# Patient Record
Sex: FEMALE | Race: WHITE | NOT HISPANIC OR LATINO | Employment: UNEMPLOYED | URBAN - METROPOLITAN AREA
[De-identification: names, ages, dates, MRNs, and addresses within clinical notes are randomized per-mention and may not be internally consistent; named-entity substitution may affect disease eponyms.]

---

## 2017-06-01 ENCOUNTER — OFFICE VISIT (OUTPATIENT)
Dept: FAMILY MEDICINE | Facility: CLINIC | Age: 67
End: 2017-06-01
Payer: COMMERCIAL

## 2017-06-01 VITALS
RESPIRATION RATE: 18 BRPM | HEIGHT: 65 IN | OXYGEN SATURATION: 99 % | SYSTOLIC BLOOD PRESSURE: 112 MMHG | BODY MASS INDEX: 31.49 KG/M2 | WEIGHT: 189 LBS | DIASTOLIC BLOOD PRESSURE: 72 MMHG | TEMPERATURE: 98 F | HEART RATE: 84 BPM

## 2017-06-01 DIAGNOSIS — R05.9 COUGH IN ADULT: ICD-10-CM

## 2017-06-01 DIAGNOSIS — J06.9 ACUTE UPPER RESPIRATORY INFECTION: Primary | ICD-10-CM

## 2017-06-01 PROBLEM — L57.0 ACTINIC KERATOSIS: Status: ACTIVE | Noted: 2017-06-01

## 2017-06-01 PROBLEM — E78.5 HYPERLIPIDEMIA: Status: ACTIVE | Noted: 2017-06-01

## 2017-06-01 PROBLEM — H91.90 DECREASED HEARING: Status: ACTIVE | Noted: 2017-06-01

## 2017-06-01 PROBLEM — H60.509 ACUTE OTITIS EXTERNA: Status: ACTIVE | Noted: 2017-06-01

## 2017-06-01 PROBLEM — Z86.69 HISTORY OF BELL'S PALSY: Status: ACTIVE | Noted: 2017-06-01

## 2017-06-01 PROCEDURE — 99214 OFFICE O/P EST MOD 30 MIN: CPT | Mod: ,,, | Performed by: FAMILY MEDICINE

## 2017-06-01 RX ORDER — AZITHROMYCIN 250 MG/1
500 TABLET, FILM COATED ORAL DAILY
Qty: 6 TABLET | Refills: 0 | Status: SHIPPED | OUTPATIENT
Start: 2017-06-01 | End: 2017-06-06

## 2017-06-01 RX ORDER — FLUTICASONE PROPIONATE 50 MCG
2 SPRAY, SUSPENSION (ML) NASAL DAILY
Qty: 1 BOTTLE | Refills: 1 | Status: SHIPPED | OUTPATIENT
Start: 2017-06-01 | End: 2017-06-15

## 2017-06-01 RX ORDER — BENZONATATE 100 MG/1
100 CAPSULE ORAL 3 TIMES DAILY PRN
Qty: 30 CAPSULE | Refills: 1 | Status: SHIPPED | OUTPATIENT
Start: 2017-06-01 | End: 2017-06-11

## 2017-06-01 NOTE — PROGRESS NOTES
Subjective:       Patient ID:  Matilda Mehta is a 66 y.o. female with multiple medical diagnoses as listed in the medical history and problem list that presents for Nasal Congestion (x's four days) and Sore Throat (pt was around grandson that is sick)  .      Chief Complaint: Nasal Congestion (x's four days) and Sore Throat (pt was around grandson that is sick)    Sore Throat    This is a new problem. The current episode started in the past 7 days. The problem has been gradually worsening. There has been no fever. The pain is at a severity of 3/10. The pain is moderate. Associated symptoms include coughing. Pertinent negatives include no abdominal pain, congestion, diarrhea, headaches, shortness of breath or vomiting. She has tried nothing for the symptoms.   Cough   This is a new problem. The current episode started in the past 7 days. The problem has been gradually worsening. The problem occurs hourly. The cough is non-productive. Associated symptoms include postnasal drip, rhinorrhea and a sore throat. Pertinent negatives include no chest pain, ear congestion, fever, headaches or shortness of breath. Nothing aggravates the symptoms. She has tried nothing for the symptoms. There is no history of bronchitis, COPD or emphysema.     Review of Systems   Constitutional: Negative for fatigue, fever and unexpected weight change.   HENT: Positive for postnasal drip, rhinorrhea and sore throat. Negative for congestion and hearing loss.    Eyes: Negative for visual disturbance.   Respiratory: Positive for cough. Negative for chest tightness and shortness of breath.    Cardiovascular: Negative for chest pain and leg swelling.   Gastrointestinal: Negative for abdominal pain, constipation, diarrhea, nausea and vomiting.   Genitourinary: Negative for difficulty urinating.   Musculoskeletal: Negative for back pain.   Neurological: Negative for dizziness, weakness and headaches.   Hematological: Negative for adenopathy.    Psychiatric/Behavioral: Negative for suicidal ideas.       Past Medical History:   Diagnosis Date    Actinic keratosis     Acute diffuse otitis externa of left ear     Acute serous otitis media of left ear     Acute upper respiratory infection     BMI 30.0-30.9,adult     Colon cancer screening     Decreased hearing of left ear     History of Bell's palsy     Hyperlipidemia     Personal history of nicotine dependence     Viral conjunctivitis of both eyes      Past Surgical History:   Procedure Laterality Date    ADENOIDECTOMY      BREAST BIOPSY       SECTION      HYSTERECTOMY      TONSILLECTOMY       Family History   Problem Relation Age of Onset    Hypertension Mother     Lung cancer Father     Brain cancer Father     Diabetes Father     Hypertension Father      Social History     Social History    Marital status:      Spouse name: N/A    Number of children: N/A    Years of education: N/A     Social History Main Topics    Smoking status: Former Smoker     Quit date:     Smokeless tobacco: None    Alcohol use No    Drug use: No    Sexual activity: No     Other Topics Concern    None     Social History Narrative    Heat and Air: Central Air    Is able to drive a car    Always uses a seat belt    Living Situation: Lives with spouse    Type of House:Single family home        Depression Screen: 2017    Pap smear: 2016-    Eye Exam: 2016    Flu Vaccine: 10/01/2015    Shingles: 2015    Pneumovax: 2015    Bone Density: several years ago    Colonoscopy: No previous testing     Current Outpatient Prescriptions   Medication Sig Dispense Refill    azithromycin (Z-BENITEZ) 250 MG tablet Take 2 tablets (500 mg total) by mouth once daily. 6 tablet 0    benzonatate (TESSALON) 100 MG capsule Take 1 capsule (100 mg total) by mouth 3 (three) times daily as needed for Cough. 30 capsule 1    fluticasone (FLONASE) 50 mcg/actuation nasal spray 2  sprays by Each Nare route once daily. 1 Bottle 1     No current facility-administered medications for this visit.      Review of patient's allergies indicates:   Allergen Reactions    Penicillins      Objective:      Vitals:    06/01/17 1442   BP: 112/72   Pulse: 84   Resp: 18   Temp: 98.1 °F (36.7 °C)     Physical Exam   Constitutional: She appears well-developed and well-nourished.   HENT:   Head: Normocephalic.   Right Ear: No drainage or swelling.   Left Ear: No drainage or swelling.   Nose: Mucosal edema and rhinorrhea present.   Mouth/Throat: Posterior oropharyngeal erythema present.   Eyes: Pupils are equal, round, and reactive to light. Right eye exhibits no discharge. Left eye exhibits no discharge. No scleral icterus.   Neck: No thyromegaly present.   Cardiovascular: Normal rate and regular rhythm.  Exam reveals no gallop and no friction rub.    No murmur heard.  Pulmonary/Chest: No respiratory distress. She has no wheezes. She has no rales. She exhibits no tenderness.   Abdominal: She exhibits no distension. There is no tenderness.   Musculoskeletal: She exhibits no tenderness.   Skin: No erythema.   Psychiatric: She has a normal mood and affect.       Assessment:       1. Acute upper respiratory infection    2. Cough in adult        Plan:       Acute upper respiratory infection  -     azithromycin (Z-BENITEZ) 250 MG tablet; Take 2 tablets (500 mg total) by mouth once daily.  Dispense: 6 tablet; Refill: 0  -     benzonatate (TESSALON) 100 MG capsule; Take 1 capsule (100 mg total) by mouth 3 (three) times daily as needed for Cough.  Dispense: 30 capsule; Refill: 1  -     fluticasone (FLONASE) 50 mcg/actuation nasal spray; 2 sprays by Each Nare route once daily.  Dispense: 1 Bottle; Refill: 1  The patient was advised to remain hydrated and take the following medications for symptomatic relief: 1) Altenative with Ibuprofen/Tylenol for myalgias/fever >100.4, 2) Tessalon Perles for throat irritation, 3) Flonase  for rhinitis, and 4) Mucinex for chest congestion. Patient will return to clinic if symptoms worsen or persist for > 1 week.       Cough in adult  -     benzonatate (TESSALON) 100 MG capsule; Take 1 capsule (100 mg total) by mouth 3 (three) times daily as needed for Cough.  Dispense: 30 capsule; Refill: 1      Return in about 2 weeks (around 6/15/2017) for URI.      6/1/2017 Christina Patton M.D.

## 2018-08-14 ENCOUNTER — DOCUMENTATION ONLY (OUTPATIENT)
Dept: FAMILY MEDICINE | Facility: CLINIC | Age: 68
End: 2018-08-14

## 2018-08-14 ENCOUNTER — OFFICE VISIT (OUTPATIENT)
Dept: FAMILY MEDICINE | Facility: CLINIC | Age: 68
End: 2018-08-14
Payer: COMMERCIAL

## 2018-08-14 VITALS
HEIGHT: 65 IN | WEIGHT: 194 LBS | HEART RATE: 86 BPM | TEMPERATURE: 98 F | DIASTOLIC BLOOD PRESSURE: 68 MMHG | BODY MASS INDEX: 32.32 KG/M2 | RESPIRATION RATE: 16 BRPM | OXYGEN SATURATION: 99 % | SYSTOLIC BLOOD PRESSURE: 98 MMHG

## 2018-08-14 DIAGNOSIS — S86.912A STRAIN OF LEFT KNEE, INITIAL ENCOUNTER: Primary | ICD-10-CM

## 2018-08-14 DIAGNOSIS — J20.9 ACUTE BRONCHITIS, UNSPECIFIED ORGANISM: ICD-10-CM

## 2018-08-14 DIAGNOSIS — E78.5 HYPERLIPIDEMIA, UNSPECIFIED HYPERLIPIDEMIA TYPE: ICD-10-CM

## 2018-08-14 PROCEDURE — 20610 DRAIN/INJ JOINT/BURSA W/O US: CPT | Mod: LT,S$GLB,, | Performed by: INTERNAL MEDICINE

## 2018-08-14 PROCEDURE — 99213 OFFICE O/P EST LOW 20 MIN: CPT | Mod: 25,S$GLB,, | Performed by: INTERNAL MEDICINE

## 2018-08-14 RX ORDER — METHYLPREDNISOLONE ACETATE 40 MG/ML
40 INJECTION, SUSPENSION INTRA-ARTICULAR; INTRALESIONAL; INTRAMUSCULAR; SOFT TISSUE
Status: DISCONTINUED | OUTPATIENT
Start: 2018-08-14 | End: 2018-08-16 | Stop reason: HOSPADM

## 2018-08-14 RX ORDER — BENZONATATE 200 MG/1
200 CAPSULE ORAL 3 TIMES DAILY PRN
Qty: 30 CAPSULE | Refills: 1 | Status: SHIPPED | OUTPATIENT
Start: 2018-08-14 | End: 2018-08-24

## 2018-08-14 RX ORDER — PREDNISONE 20 MG/1
40 TABLET ORAL DAILY
Qty: 4 TABLET | Refills: 0 | Status: SHIPPED | OUTPATIENT
Start: 2018-08-14 | End: 2018-08-16

## 2018-08-14 RX ADMIN — METHYLPREDNISOLONE ACETATE 40 MG: 40 INJECTION, SUSPENSION INTRA-ARTICULAR; INTRALESIONAL; INTRAMUSCULAR; SOFT TISSUE at 03:08

## 2018-08-14 NOTE — PROGRESS NOTES
Health Maintenance Due   Topic Date Due    Hepatitis C Screening  1950    Lipid Panel  1950    Eye Exam  07/22/1962    TETANUS VACCINE  07/22/1970    Colonoscopy  07/22/1970    Pap Smear  07/22/1973    DEXA SCAN  07/22/1992    Zoster Vaccine  07/22/2012    Pneumococcal (65+) (1 of 2 - PCV13) 07/22/2017    Influenza Vaccine  08/01/2018    Mammogram  10/12/2018

## 2018-08-14 NOTE — PROCEDURES
Large Joint Aspiration/Injection: L knee  Date/Time: 8/14/2018 3:19 PM  Performed by: Drake Patel MD  Authorized by: Drake Patel MD     Consent Done?:  Yes (Written)  Indications:  Pain  Timeout: Prior to procedure the correct patient, procedure, and site was verified      Location:  Knee  Site:  L knee  Prep: Patient was prepped and draped in usual sterile fashion    Needle size:  25 G  Medications:  40 mg methylPREDNISolone acetate 40 mg/mL  Patient tolerance:  Patient tolerated the procedure well with no immediate complications

## 2018-08-14 NOTE — PROGRESS NOTES
Subjective:       Patient ID: Matilda Mehta is a 68 y.o. female.    Chief Complaint: Establish Care; Cough (congestion); ears clogged; and Sore Throat    HPI         CHIEF COMPLAINT: Cough(+).  HPI:     ONSET/TIMIN d ago    DURATION:               Paroxysmal: no .    QUALITY/COURSE:. unchanged    INTENSITY/SEVERITY: Severity is #  2 (10 point scale)      The following symptoms/statements are positive if BOLD, negative otherwise.      CONTEXT/WHEN:  Tobacco_use. Smokers_in_home. Seasonal_pattern. Allergies/Hayfever. Sinusitis. Irritant_Exposure(smoke/dust/fumes). Exposure_to_others_with_similar_symptoms.        Similar_problems_in_past.   PAST TREATMENT OR EVALUATION:   previous PPD. Recent_previous_chest_x-ray. Recent_antibiotics.  Associated Symptoms:     sputum production: scant. copious. Hemoptysis.  Medical History: Past_pulmonary_infections.  Cardiovascular_disease.chronic_lung_disease.  tuberculosis. Asthma. AIDS. Gastroesophageal_reflux_disease .        Lower_Extremity_Problems(+). Pain: yes. . Injury: no.   HPI:     ONSET/TIMING: . Onset    7 days  ago.     DURATION:   Intermittent         QUALITY/COURSE:    unchanged ,. .     LOCATION:     Left knee laterally   . Radiation: no.      INTENSITY/SEVERITY:. Severity is #   8   (10 point scale).        MODIFIERS/TREATMENTS: Current_Limitations_are:  none..   Taking medications: no    Physical_Therapy: no.  Chiropractor: no.     SYMPTOMS/RELATED: . --Possible medication side effects include:.     The following symptoms/statements  are positive if BOLD, negative otherwise.      CONTEXT/WHEN: . Activity . weight bearing. Inactivity.  Sudden  Work related.  Similar_problems_in past.   . Litigation_pending . X-rays. CT . MRI      REVIEW OF SYMPTOMS:   Numbness. Weakness. Muscle_Problems. Fever. Joint_Problems. Swelling. Erythema. Weight_loss. Instability.      .             Review of Systems   Constitutional: Negative for chills, diaphoresis, fever and  "unexpected weight change.   HENT: Positive for sore throat. Negative for rhinorrhea and sinus pressure.    Respiratory: Positive for cough. Negative for shortness of breath and wheezing.    Cardiovascular: Negative for chest pain.   Musculoskeletal: Positive for arthralgias. Negative for myalgias.         Objective:      Vitals:    08/14/18 1430   BP: 98/68   Pulse: 86   Resp: 16   Temp: 98 °F (36.7 °C)   SpO2: 99%   Weight: 88 kg (194 lb 0.1 oz)   Height: 5' 5" (1.651 m)   PainSc:   8   PainLoc: Knee     Physical Exam   Constitutional: She appears well-developed and well-nourished.   HENT:   Right Ear: External ear normal.   Left Ear: External ear normal.   Mouth/Throat: Oropharynx is clear and moist. No oropharyngeal exudate.   Cardiovascular: Normal rate, regular rhythm and normal heart sounds. Exam reveals no friction rub.   No murmur heard.  Pulmonary/Chest: Effort normal and breath sounds normal. No respiratory distress. She has no wheezes. She has no rales. She exhibits no tenderness.   Abdominal: Soft. Bowel sounds are normal. There is no tenderness.   Musculoskeletal: She exhibits no edema.   The left knee is tender both medially and laterally but greater on the lateral aspect.  Anterior drawer sign is negative.  The patient has is grading sensation with flexion and extension.  But no popping.   Lymphadenopathy:     She has no cervical adenopathy.   Nursing note and vitals reviewed.        Assessment:       1. Strain of left knee, initial encounter    2. Acute bronchitis, unspecified organism    3. Hyperlipidemia, unspecified hyperlipidemia type          Plan:       Strain of left knee, initial encounter  -     Large Joint Aspiration/Injection: L knee  -     methylPREDNISolone acetate injection 40 mg; Inject 40 mg into the articular space.  -     predniSONE (DELTASONE) 20 MG tablet; Take 2 tablets (40 mg total) by mouth once daily. for 2 days  Dispense: 4 tablet; Refill: 0    Acute bronchitis, unspecified " organism  -     benzonatate (TESSALON) 200 MG capsule; Take 1 capsule (200 mg total) by mouth 3 (three) times daily as needed for Cough.  Dispense: 30 capsule; Refill: 1    Hyperlipidemia, unspecified hyperlipidemia type  -     CBC auto differential; Future; Expected date: 08/14/2018  -     Comprehensive metabolic panel; Future; Expected date: 08/14/2018  -     Lipid panel; Future; Expected date: 08/14/2018      Follow-up in about 6 weeks (around 9/25/2018).

## 2018-08-16 DIAGNOSIS — Z11.59 NEED FOR HEPATITIS C SCREENING TEST: ICD-10-CM

## 2018-08-16 DIAGNOSIS — Z12.11 COLON CANCER SCREENING: ICD-10-CM

## 2018-08-20 ENCOUNTER — TELEPHONE (OUTPATIENT)
Dept: FAMILY MEDICINE | Facility: CLINIC | Age: 68
End: 2018-08-20

## 2018-08-20 NOTE — TELEPHONE ENCOUNTER
----- Message from Abbie Metcalf sent at 8/20/2018  9:00 AM CDT -----  Contact: Patient  Type: Needs Medical Advice    Who Called: patient  Symptoms (please be specific):  Upper respiratory infection  Pharmacy name and phone #:    [id number]  [place], [place] - [address]  [address]  [facility] [address]  Phone: [phone] Fax: [phone]    Best Call Back Number:[phone] (home)     Additional Information: patient would like something called into the pharmacy for an Upper Respiratory Infection, please call her back as soon as possible to advise or update.. Thank you!

## 2018-08-21 ENCOUNTER — PATIENT OUTREACH (OUTPATIENT)
Dept: ADMINISTRATIVE | Facility: HOSPITAL | Age: 68
End: 2018-08-21

## 2018-08-21 DIAGNOSIS — Z78.0 ASYMPTOMATIC MENOPAUSAL STATE: Primary | ICD-10-CM

## 2018-08-21 DIAGNOSIS — M25.562 LEFT KNEE PAIN, UNSPECIFIED CHRONICITY: Primary | ICD-10-CM

## 2018-08-23 ENCOUNTER — HOSPITAL ENCOUNTER (OUTPATIENT)
Dept: RADIOLOGY | Facility: HOSPITAL | Age: 68
Discharge: HOME | End: 2018-08-23
Attending: ORTHOPAEDIC SURGERY
Payer: COMMERCIAL

## 2018-08-23 ENCOUNTER — OFFICE VISIT (OUTPATIENT)
Dept: ORTHOPEDICS | Facility: CLINIC | Age: 68
End: 2018-08-23
Payer: COMMERCIAL

## 2018-08-23 VITALS
DIASTOLIC BLOOD PRESSURE: 68 MMHG | BODY MASS INDEX: 32.32 KG/M2 | SYSTOLIC BLOOD PRESSURE: 111 MMHG | WEIGHT: 194 LBS | HEIGHT: 65 IN | HEART RATE: 79 BPM

## 2018-08-23 DIAGNOSIS — M25.562 LEFT KNEE PAIN, UNSPECIFIED CHRONICITY: ICD-10-CM

## 2018-08-23 DIAGNOSIS — S83.282A ACUTE LATERAL MENISCAL TEAR, LEFT, INITIAL ENCOUNTER: Primary | ICD-10-CM

## 2018-08-23 DIAGNOSIS — M25.562 LEFT KNEE PAIN, UNSPECIFIED CHRONICITY: Primary | ICD-10-CM

## 2018-08-23 PROCEDURE — 99999 PR PBB SHADOW E&M-EST. PATIENT-LVL III: CPT | Mod: PBBFAC,,, | Performed by: ORTHOPAEDIC SURGERY

## 2018-08-23 PROCEDURE — 73564 X-RAY EXAM KNEE 4 OR MORE: CPT | Mod: 26,LT,, | Performed by: RADIOLOGY

## 2018-08-23 PROCEDURE — 99243 OFF/OP CNSLTJ NEW/EST LOW 30: CPT | Mod: S$GLB,,, | Performed by: ORTHOPAEDIC SURGERY

## 2018-08-23 PROCEDURE — 73562 X-RAY EXAM OF KNEE 3: CPT | Mod: TC,PN,RT

## 2018-08-23 PROCEDURE — 73562 X-RAY EXAM OF KNEE 3: CPT | Mod: 26,XS,RT, | Performed by: RADIOLOGY

## 2018-08-23 NOTE — LETTER
August 23, 2018      Drake Patel MD  [address]  [facility] [address]           [address] [address]  Phone: [phone]          Patient: Matilda Mehta   MR Number: [id number]   YOB: 1950   Date of Visit: 8/23/2018       Dear Dr. Drake Patel:    Thank you for referring Matilda Mehta to me for evaluation. Attached you will find relevant portions of my assessment and plan of care.    If you have questions, please do not hesitate to call me. I look forward to following Matilda Mehta along with you.    Sincerely,    Brennen Lozano MD    Enclosure  CC:  No Recipients    If you would like to receive this communication electronically, please contact [email or web address] or [phone] to request more information on [employer] access.    For providers and/or their staff who would like to refer a patient to [facility], please contact us through our one-stop-shop provider referral line, Selvin Godinez, at [phone].    If you feel you have received this communication in error or would no longer like to receive these types of communications, please e-mail [email or web address]

## 2018-08-24 ENCOUNTER — OFFICE VISIT (OUTPATIENT)
Dept: FAMILY MEDICINE | Facility: CLINIC | Age: 68
End: 2018-08-24
Payer: COMMERCIAL

## 2018-08-24 ENCOUNTER — DOCUMENTATION ONLY (OUTPATIENT)
Dept: FAMILY MEDICINE | Facility: CLINIC | Age: 68
End: 2018-08-24

## 2018-08-24 VITALS
DIASTOLIC BLOOD PRESSURE: 78 MMHG | SYSTOLIC BLOOD PRESSURE: 134 MMHG | HEART RATE: 87 BPM | WEIGHT: 194.88 LBS | OXYGEN SATURATION: 99 % | BODY MASS INDEX: 32.47 KG/M2 | TEMPERATURE: 98 F | RESPIRATION RATE: 16 BRPM | HEIGHT: 65 IN

## 2018-08-24 DIAGNOSIS — Z11.59 ENCOUNTER FOR HEPATITIS C SCREENING TEST FOR LOW RISK PATIENT: ICD-10-CM

## 2018-08-24 DIAGNOSIS — J20.9 ACUTE BRONCHITIS, UNSPECIFIED ORGANISM: Primary | ICD-10-CM

## 2018-08-24 DIAGNOSIS — Z12.11 COLON CANCER SCREENING: ICD-10-CM

## 2018-08-24 DIAGNOSIS — E78.5 HYPERLIPIDEMIA, UNSPECIFIED HYPERLIPIDEMIA TYPE: ICD-10-CM

## 2018-08-24 PROCEDURE — 99213 OFFICE O/P EST LOW 20 MIN: CPT | Mod: S$GLB,,, | Performed by: INTERNAL MEDICINE

## 2018-08-24 RX ORDER — DOXYCYCLINE 100 MG/1
100 CAPSULE ORAL EVERY 12 HOURS
Qty: 20 CAPSULE | Refills: 0 | Status: SHIPPED | OUTPATIENT
Start: 2018-08-24 | End: 2018-09-10

## 2018-08-24 RX ORDER — PROMETHAZINE HYDROCHLORIDE AND DEXTROMETHORPHAN HYDROBROMIDE 6.25; 15 MG/5ML; MG/5ML
5 SYRUP ORAL EVERY 6 HOURS PRN
Qty: 200 ML | Refills: 0 | Status: SHIPPED | OUTPATIENT
Start: 2018-08-24 | End: 2018-09-03

## 2018-08-24 RX ORDER — AMOXICILLIN AND CLAVULANATE POTASSIUM 875; 125 MG/1; MG/1
1 TABLET, FILM COATED ORAL 2 TIMES DAILY
Qty: 20 TABLET | Refills: 0 | Status: CANCELLED | OUTPATIENT
Start: 2018-08-24

## 2018-08-24 RX ORDER — PREDNISONE 20 MG/1
40 TABLET ORAL DAILY
Qty: 10 TABLET | Refills: 0 | Status: SHIPPED | OUTPATIENT
Start: 2018-08-24 | End: 2018-08-29

## 2018-08-24 RX ORDER — CETIRIZINE HYDROCHLORIDE 10 MG/1
10 TABLET ORAL DAILY
Refills: 0 | COMMUNITY
Start: 2018-08-24 | End: 2018-09-10

## 2018-08-24 NOTE — PROGRESS NOTES
"Subjective:       Patient ID: Matilda Mehta is a 68 y.o. female.    Chief Complaint: Cough; Otalgia; and Sinus Problem    HPI         CHIEF COMPLAINT: Cough(+).  HPI:     ONSET/TIMIN d  ago    DURATION:               Paroxysmal: no .    QUALITY/COURSE:. worse    INTENSITY/SEVERITY: Severity is # 8(10 point scale)      The following symptoms/statements are positive if BOLD, negative otherwise.      CONTEXT/WHEN:  Tobacco_use. Smokers_in_home. Seasonal_pattern. Allergies/Hayfever. Sinusitis. Irritant_Exposure(smoke/dust/fumes). Exposure_to_others_with_similar_symptoms.        Similar_problems_in_past.   PAST TREATMENT OR EVALUATION:   previous PPD. Recent_previous_chest_x-ray. Recent_antibiotics.  Associated Symptoms:     sputum production: scant. copious. Hemoptysis.  Medical History: Past_pulmonary_infections.  Cardiovascular_disease.chronic_lung_disease.  tuberculosis. Asthma. AIDS. Gastroesophageal_reflux_disease .      Review of Systems   Constitutional: Negative for chills, diaphoresis, fever and unexpected weight change.   HENT: Positive for congestion and ear pain (bilateral). Negative for rhinorrhea, sinus pressure and sore throat.    Respiratory: Positive for cough, shortness of breath and wheezing.    Cardiovascular: Positive for chest pain (with cough).   Musculoskeletal: Negative for myalgias.         Objective:      Vitals:    18 1426   BP: 134/78   Pulse: 87   Resp: 16   Temp: 97.7 °F (36.5 °C)   TempSrc: Oral   SpO2: 99%   Weight: 88.4 kg (194 lb 14.2 oz)   Height: 5' 5" (1.651 m)   PainSc: 0-No pain     Physical Exam   Constitutional: She appears well-developed and well-nourished.   HENT:   Right Ear: External ear normal.   Left Ear: External ear normal.   Mouth/Throat: Oropharynx is clear and moist. No oropharyngeal exudate.   Tympanic membranes normal.  No TMJ tenderness.   Cardiovascular: Normal rate, regular rhythm and normal heart sounds. Exam reveals no friction rub.   No murmur " heard.  Pulmonary/Chest: Effort normal and breath sounds normal. No respiratory distress. She has no wheezes. She has no rales. She exhibits no tenderness.   Abdominal: Soft. Bowel sounds are normal. There is no tenderness.   Musculoskeletal: She exhibits no edema.   Lymphadenopathy:     She has no cervical adenopathy.   Neurological: She is alert.   Psychiatric: She has a normal mood and affect. Her behavior is normal. Thought content normal.   Nursing note and vitals reviewed.        Assessment:       1. Acute bronchitis, unspecified organism    2. Encounter for hepatitis C screening test for low risk patient    3. Hyperlipidemia, unspecified hyperlipidemia type    4. Colon cancer screening          Plan:       Acute bronchitis, unspecified organism  -     cetirizine (ZYRTEC) 10 MG tablet; Take 1 tablet (10 mg total) by mouth once daily.; Refill: 0  -     promethazine-dextromethorphan (PROMETHAZINE-DM) 6.25-15 mg/5 mL Syrp; Take 5 mLs by mouth every 6 (six) hours as needed.  Dispense: 200 mL; Refill: 0  -     predniSONE (DELTASONE) 20 MG tablet; Take 2 tablets (40 mg total) by mouth once daily. for 5 days  Dispense: 10 tablet; Refill: 0  -     doxycycline (VIBRAMYCIN) 100 MG Cap; Take 1 capsule (100 mg total) by mouth every 12 (twelve) hours.  Dispense: 20 capsule; Refill: 0    Encounter for hepatitis C screening test for low risk patient  -     Hepatitis C antibody; Future; Expected date: 08/24/2018    Hyperlipidemia, unspecified hyperlipidemia type  -     Lipid panel; Future; Expected date: 08/24/2018    Colon cancer screening  -     Fecal Immunochemical Test (iFOBT); Future; Expected date: 09/07/2018    Other orders  -     Cancel: amoxicillin-clavulanate 875-125mg (AUGMENTIN) 875-125 mg per tablet; Take 1 tablet by mouth 2 (two) times daily.  Dispense: 20 tablet; Refill: 0      Follow-up in about 6 months (around 2/24/2019) for if you are not better return in 2 weeks.

## 2018-08-24 NOTE — PROGRESS NOTES
Health Maintenance Due   Topic Date Due    Hepatitis C Screening  1950    Lipid Panel  1950    TETANUS VACCINE  07/22/1970    DEXA SCAN  07/22/1992    Colonoscopy  07/22/2002    Pneumococcal (65+) (1 of 2 - PCV13) 07/22/2017    Influenza Vaccine  08/01/2018

## 2018-09-10 ENCOUNTER — OFFICE VISIT (OUTPATIENT)
Dept: FAMILY MEDICINE | Facility: CLINIC | Age: 68
End: 2018-09-10
Payer: COMMERCIAL

## 2018-09-10 VITALS
RESPIRATION RATE: 16 BRPM | WEIGHT: 194 LBS | HEIGHT: 65 IN | BODY MASS INDEX: 32.32 KG/M2 | TEMPERATURE: 98 F | HEART RATE: 76 BPM | SYSTOLIC BLOOD PRESSURE: 114 MMHG | OXYGEN SATURATION: 97 % | DIASTOLIC BLOOD PRESSURE: 68 MMHG

## 2018-09-10 DIAGNOSIS — Z12.39 BREAST CANCER SCREENING: ICD-10-CM

## 2018-09-10 DIAGNOSIS — M89.9 BONE DISEASE: ICD-10-CM

## 2018-09-10 DIAGNOSIS — B37.2 YEAST DERMATITIS: ICD-10-CM

## 2018-09-10 DIAGNOSIS — Z11.59 NEED FOR HEPATITIS C SCREENING TEST: ICD-10-CM

## 2018-09-10 DIAGNOSIS — Z13.220 NEED FOR LIPID SCREENING: ICD-10-CM

## 2018-09-10 DIAGNOSIS — Z12.11 SCREEN FOR COLON CANCER: ICD-10-CM

## 2018-09-10 DIAGNOSIS — R09.81 SINUS CONGESTION: Primary | ICD-10-CM

## 2018-09-10 PROCEDURE — 99203 OFFICE O/P NEW LOW 30 MIN: CPT | Mod: ,,, | Performed by: INTERNAL MEDICINE

## 2018-09-10 RX ORDER — GUAIFENESIN 600 MG/1
1200 TABLET, EXTENDED RELEASE ORAL 2 TIMES DAILY
COMMUNITY
End: 2022-09-02

## 2018-09-10 RX ORDER — HYDROCODONE POLISTIREX AND CHLORPHENIRAMINE POLISTIREX 10; 8 MG/5ML; MG/5ML
5 SUSPENSION, EXTENDED RELEASE ORAL EVERY 12 HOURS PRN
Qty: 150 ML | Refills: 0 | Status: SHIPPED | OUTPATIENT
Start: 2018-09-10 | End: 2022-09-02

## 2018-09-10 RX ORDER — NYSTATIN AND TRIAMCINOLONE ACETONIDE 100000; 1 [USP'U]/G; MG/G
CREAM TOPICAL 4 TIMES DAILY
Qty: 30 G | Refills: 0 | Status: SHIPPED | OUTPATIENT
Start: 2018-09-10 | End: 2022-09-02

## 2018-09-10 RX ORDER — AZITHROMYCIN 250 MG/1
TABLET, FILM COATED ORAL
Qty: 6 TABLET | Refills: 0 | Status: SHIPPED | OUTPATIENT
Start: 2018-09-10 | End: 2018-09-15

## 2018-09-10 NOTE — PROGRESS NOTES
SUBJECTIVE:    Patient ID: Matilda Mehta is a 68 y.o. female.    Chief Complaint: Rash (on stomach)    HPI     Here for chest cold-since beginning of August-was on doxy 100 bid an prednisone and prednisone-continues with cough and oc wheezes-she is bringing up mucous oc yellow mucous-fever at first to 101 none now-Pt does not smoke but smoked in the past-before Chela--she noted a rash on the tummy 4 days ago-but it is in the intertriginous area-    No visits with results within 6 Month(s) from this visit.   Latest known visit with results is:   No results found for any previous visit.       Past Medical History:   Diagnosis Date    Actinic keratosis     Acute diffuse otitis externa of left ear     Acute serous otitis media of left ear     Acute upper respiratory infection     BMI 30.0-30.9,adult     Colon cancer screening     Decreased hearing of left ear     History of Bell's palsy     Hyperlipidemia     Personal history of nicotine dependence     Viral conjunctivitis of both eyes      Social History     Socioeconomic History    Marital status:      Spouse name: Not on file    Number of children: Not on file    Years of education: Not on file    Highest education level: Not on file   Social Needs    Financial resource strain: Not on file    Food insecurity - worry: Not on file    Food insecurity - inability: Not on file    Transportation needs - medical: Not on file    Transportation needs - non-medical: Not on file   Occupational History    Not on file   Tobacco Use    Smoking status: Former Smoker     Types: Cigarettes     Last attempt to quit: 1985     Years since quittin.7    Smokeless tobacco: Never Used   Substance and Sexual Activity    Alcohol use: No    Drug use: No    Sexual activity: No   Other Topics Concern    Not on file   Social History Narrative    Heat and Air: Central Air    Is able to drive a car    Always uses a seat belt    Living Situation: Lives with  "spouse    Type of House:Single family home        Depression Screen: 2017    Pap smear: 2016-    Eye Exam: 2016    Flu Vaccine: 10/01/2015    Shingles: 2015    Pneumovax: 2015    Bone Density: several years ago    Colonoscopy: No previous testing     Past Surgical History:   Procedure Laterality Date    ADENOIDECTOMY      BREAST BIOPSY       SECTION      HYSTERECTOMY      TONSILLECTOMY       Family History   Problem Relation Age of Onset    Hypertension Mother     Lung cancer Father     Brain cancer Father     Diabetes Father     Hypertension Father      Vitals:    09/10/18 1328   BP: 114/68   Pulse: 76   Resp: 16   Temp: 97.9 °F (36.6 °C)   SpO2: 97%   Weight: 88 kg (194 lb)   Height: 5' 5" (1.651 m)       Review of Systems   Constitutional: Negative for chills, fatigue, fever and unexpected weight change.        Appetite-cant stop sweets   HENT: Negative for congestion (sinus congestion-ears and head stopped up), ear pain, hearing loss, sinus pain and sore throat.    Eyes: Negative for pain and visual disturbance.        Yearly checks   Respiratory: Negative for cough, shortness of breath and wheezing.         HPI   Cardiovascular: Negative for chest pain, palpitations and leg swelling.   Gastrointestinal: Negative for abdominal pain, blood in stool, constipation, diarrhea, nausea and vomiting.   Endocrine: Negative for cold intolerance and heat intolerance.   Genitourinary: Negative for difficulty urinating, dysuria, frequency, pelvic pain and urgency.   Musculoskeletal: Negative for back pain, joint swelling and neck pain.   Skin: Negative for pallor and rash.   Neurological: Negative for dizziness, tremors, weakness, numbness and headaches.   Hematological: Does not bruise/bleed easily.   Psychiatric/Behavioral: Negative for agitation, sleep disturbance and suicidal ideas.          Objective:      Physical Exam   Constitutional: She is oriented to person, " place, and time. She appears well-developed and well-nourished. She is cooperative.   BMI...   HENT:   Head: Normocephalic and atraumatic.   Right Ear: Tympanic membrane normal.   Left Ear: Tympanic membrane normal.   Eyes: Conjunctivae and EOM are normal. Right pupil is round and reactive. Left pupil is round and reactive.   Neck: Trachea normal and normal range of motion. Neck supple.   Cardiovascular: Normal rate, regular rhythm, S1 normal, S2 normal, normal heart sounds and intact distal pulses.   Pulmonary/Chest: Breath sounds normal.   Abdominal: Soft. Bowel sounds are normal. There is no rigidity and no guarding.   Musculoskeletal: Normal range of motion.   Lymphadenopathy:     She has no cervical adenopathy.     She has no axillary adenopathy.   Neurological: She is alert and oriented to person, place, and time.   Skin: Skin is warm and dry. Capillary refill takes less than 2 seconds.   Erythematous dry patch in the lower abdomen intertriginous fold   Psychiatric: She has a normal mood and affect. Her behavior is normal. Judgment and thought content normal.   Nursing note and vitals reviewed.          Assessment:       1. Sinus congestion    2. BMI 32.0-32.9,adult    3. Screen for colon cancer    4. Need for lipid screening    5. Need for hepatitis C screening test    6. Breast cancer screening    7. Bone disease    8. Yeast dermatitis         Plan:           Sinus congestion  -     CBC auto differential; Future; Expected date: 09/10/2018  -     azithromycin (Z-BENITEZ) 250 MG tablet; Take 2 tablets by mouth on day 1; Take 1 tablet by mouth on days 2-5  Dispense: 6 tablet; Refill: 0  -     hydrocodone-chlorpheniramine (TUSSIONEX) 10-8 mg/5 mL suspension; Take 5 mLs by mouth every 12 (twelve) hours as needed for Cough.  Dispense: 150 mL; Refill: 0    BMI 32.0-32.9,adult    Screen for colon cancer  -     Cologuard Screening (Multitarget Stool DNA); Future; Expected date: 09/10/2018    Need for lipid screening  -      Lipid panel; Future; Expected date: 09/10/2018  -     Comprehensive metabolic panel; Future; Expected date: 09/10/2018    Need for hepatitis C screening test  -     Hepatitis C antibody; Future; Expected date: 09/10/2018    Breast cancer screening  -     Mammo Digital Screening Bilat without CA    Bone disease  -     DXA Bone Density Spine And Hip    Yeast dermatitis  -     nystatin-triamcinolone (MYCOLOG II) cream; Apply topically 4 (four) times daily.  Dispense: 30 g; Refill: 0

## 2018-09-10 NOTE — PATIENT INSTRUCTIONS
The patient is asked to make an attempt to improve diet and exercise patterns to aid in medical management of this problem.    Mothers apple cider vinegar-one teaspoon in 6 oz of warm water-take with 95% garcinia cambogia every am and 45 min later start day

## 2018-10-23 ENCOUNTER — TELEPHONE (OUTPATIENT)
Dept: FAMILY MEDICINE | Facility: CLINIC | Age: 68
End: 2018-10-23

## 2018-10-23 NOTE — TELEPHONE ENCOUNTER
----- Message from Nasima Mcnamara MD sent at 10/21/2018  8:00 AM CDT -----  Test results are normal

## 2019-10-17 DIAGNOSIS — Z12.31 ENCOUNTER FOR SCREENING MAMMOGRAM FOR MALIGNANT NEOPLASM OF BREAST: Primary | ICD-10-CM

## 2019-10-25 ENCOUNTER — HOSPITAL ENCOUNTER (OUTPATIENT)
Dept: RADIOLOGY | Facility: HOSPITAL | Age: 69
Discharge: HOME | End: 2019-10-25
Attending: SPECIALIST
Payer: COMMERCIAL

## 2019-10-25 DIAGNOSIS — Z12.31 ENCOUNTER FOR SCREENING MAMMOGRAM FOR MALIGNANT NEOPLASM OF BREAST: ICD-10-CM

## 2019-10-25 DIAGNOSIS — Z12.11 COLON CANCER SCREENING: ICD-10-CM

## 2019-10-25 PROCEDURE — 77067 SCR MAMMO BI INCL CAD: CPT | Mod: TC,PO

## 2020-01-22 ENCOUNTER — OFFICE VISIT (OUTPATIENT)
Dept: FAMILY MEDICINE | Facility: CLINIC | Age: 70
End: 2020-01-22
Payer: COMMERCIAL

## 2020-01-22 VITALS
DIASTOLIC BLOOD PRESSURE: 62 MMHG | BODY MASS INDEX: 32.45 KG/M2 | SYSTOLIC BLOOD PRESSURE: 110 MMHG | RESPIRATION RATE: 18 BRPM | TEMPERATURE: 98 F | HEART RATE: 66 BPM | WEIGHT: 195 LBS | OXYGEN SATURATION: 97 %

## 2020-01-22 DIAGNOSIS — E66.9 OBESITY (BMI 30.0-34.9): ICD-10-CM

## 2020-01-22 DIAGNOSIS — E78.5 HYPERLIPIDEMIA, UNSPECIFIED HYPERLIPIDEMIA TYPE: ICD-10-CM

## 2020-01-22 DIAGNOSIS — Z00.00 PREVENTATIVE HEALTH CARE: ICD-10-CM

## 2020-01-22 DIAGNOSIS — B02.9 HERPES ZOSTER WITHOUT COMPLICATION: ICD-10-CM

## 2020-01-22 DIAGNOSIS — Z23 IMMUNIZATION DUE: Primary | ICD-10-CM

## 2020-01-22 PROBLEM — E66.811 OBESITY (BMI 30.0-34.9): Status: ACTIVE | Noted: 2020-01-22

## 2020-01-22 PROCEDURE — 99214 OFFICE O/P EST MOD 30 MIN: CPT | Mod: S$GLB,,, | Performed by: FAMILY MEDICINE

## 2020-01-22 PROCEDURE — 99214 PR OFFICE/OUTPT VISIT, EST, LEVL IV, 30-39 MIN: ICD-10-PCS | Mod: S$GLB,,, | Performed by: FAMILY MEDICINE

## 2020-01-22 PROCEDURE — 1159F PR MEDICATION LIST DOCUMENTED IN MEDICAL RECORD: ICD-10-PCS | Mod: S$GLB,,, | Performed by: FAMILY MEDICINE

## 2020-01-22 PROCEDURE — 1159F MED LIST DOCD IN RCRD: CPT | Mod: S$GLB,,, | Performed by: FAMILY MEDICINE

## 2020-01-22 RX ORDER — ACYCLOVIR 400 MG/1
400 TABLET ORAL 2 TIMES DAILY
Qty: 14 TABLET | Refills: 0 | Status: SHIPPED | OUTPATIENT
Start: 2020-01-22 | End: 2022-09-02

## 2020-01-22 NOTE — PATIENT INSTRUCTIONS
[facility]  Diabetes: Learning About Serving and Portion Sizes     A good rule of thumb: Devote half your plate to vegetables and green salad. Split the other half between protein and starchy carbohydrates. Fruit makes a good dessert.     Servings and portions. Whats the difference? These terms can be very confusing. But learning to measure serving sizes can help you figure out how many carbohydrates (carbs) and other foods you eat each day. They are also powerful tools for managing your weight.  Servings and portions  Many different words are used to describe amounts of food. If your health care provider uses a term youre not sure of, dont be afraid to ask. It helps to know the difference between servings and portions:  · A serving size is a fixed size. Food producers use this term to describe their products. For example, the label on a cereal box could say that 1 cup of dry cereal = 1 serving.  · A portion (also called a helping) is how much you eat or how much you put on your plate at a meal. For example, you might eat 2 cups of cereal at breakfast.  Using serving information  The portion you choose to eat (such as 2 cups of cereal) may be more than one serving as listed on the food label (such as 1 cup of cereal). Thats why it helps to measure or weigh the food you eat. Because the food label values are based on servings, youll need to know how many servings you eat at one sitting.     Ounces: 2 to 3 ounces is about the size of your palm.       1 Cup: 1 cup (or a medium-sized piece) is about the size of your fist.       1/2 Cup: 1/2 cup is about the size of your cupped hand.      One tablespoon is about the size of your thumb.  One teaspoon is about the size of the tip of your thumb.  Keeping track of serving sizes  When youre planning for a snack or a meal, keep servings in mind. If you dont have measuring cups or a scale handy, there are ways to eyeball serving sizes, such as comparing your food  to the size of your hand (see pictures above).  Managing portion sizes  If your weight is a concern, reducing your portions can help. You can eat more than one serving of a food at once. But to keep from eating too much at one meal, learn how to manage your portions. A portion is the amount of each type of food on your plate. See the plate diagram for an example of balanced portions.  Date Last Reviewed: 3/1/2016  © 2703-4499 [employer]. [address] [place] [id number]. All rights reserved. This information is not intended as a substitute for professional medical care. Always follow your healthcare professional's instructions.

## 2020-01-22 NOTE — PROGRESS NOTES
Subjective:       Patient ID: Matilda Mehta is a 69 y.o. female.    Chief Complaint: Rash (possible cellulitis/shingles, lower left side, painful to touch or lay on left side, little rash in the am)      4-5 days of a painful area on the left lower back near the midline this has been 4-5 days in duration but then a rash came up 2 days ago.  No radiation, no history of trauma.  Patient did get the Zostavax in 2016.    Rash   This is a new problem. The current episode started in the past 7 days. The affected locations include the back. She was exposed to nothing. Past treatments include nothing. The treatment provided no relief. (Cellulitis breast.)       Allergies and Medications:   Review of patient's allergies indicates:   Allergen Reactions    Penicillins      Current Outpatient Medications   Medication Sig Dispense Refill    acyclovir (ZOVIRAX) 400 MG tablet Take 1 tablet (400 mg total) by mouth 2 (two) times daily. for 7 days 14 tablet 0    FLUAD 1031-4502, 65 YR UP,,PF, 45 mcg (15 mcg x 3)/0.5 mL Syrg ADM 0.5ML IM UTD      guaiFENesin (MUCINEX) 600 mg 12 hr tablet Take 1,200 mg by mouth 2 (two) times daily.      hydrocodone-chlorpheniramine (TUSSIONEX) 10-8 mg/5 mL suspension Take 5 mLs by mouth every 12 (twelve) hours as needed for Cough. (Patient not taking: Reported on 1/22/2020) 150 mL 0    nystatin-triamcinolone (MYCOLOG II) cream Apply topically 4 (four) times daily. (Patient not taking: Reported on 1/22/2020) 30 g 0    [START ON 2/22/2020] varicella-zoster gE-AS01B, PF, (SHINGRIX, PF,) 50 mcg/0.5 mL injection Inject 0.5 mLs into the muscle once. for 1 dose 0.5 mL 0     No current facility-administered medications for this visit.        Family History:   Family History   Problem Relation Age of Onset    Hypertension Mother     Lung cancer Father     Brain cancer Father     Diabetes Father     Hypertension Father        Social History:   Social History     Socioeconomic History    Marital  status:      Spouse name: Not on file    Number of children: Not on file    Years of education: Not on file    Highest education level: Not on file   Occupational History    Not on file   Social Needs    Financial resource strain: Not on file    Food insecurity:     Worry: Not on file     Inability: Not on file    Transportation needs:     Medical: Not on file     Non-medical: Not on file   Tobacco Use    Smoking status: Former Smoker     Types: Cigarettes     Last attempt to quit: 1985     Years since quittin.0    Smokeless tobacco: Never Used   Substance and Sexual Activity    Alcohol use: No    Drug use: No    Sexual activity: Never   Lifestyle    Physical activity:     Days per week: Not on file     Minutes per session: Not on file    Stress: Not on file   Relationships    Social connections:     Talks on phone: Not on file     Gets together: Not on file     Attends Sabianist service: Not on file     Active member of club or organization: Not on file     Attends meetings of clubs or organizations: Not on file     Relationship status: Not on file   Other Topics Concern    Not on file   Social History Narrative    Heat and Air: Central Air    Is able to drive a car    Always uses a seat belt    Living Situation: Lives with spouse    Type of House:Single family home        Depression Screen: 2017    Pap smear: 2016-    Eye Exam: 2016    Flu Vaccine: 10/01/2015    Shingles: 2015    Pneumovax: 2015    Bone Density: several years ago    Colonoscopy: No previous testing       Review of Systems   Skin: Positive for rash.       Objective:     Vitals:    20 1017   BP: 110/62   Pulse: 66   Resp: 18   Temp: 98 °F (36.7 °C)        Physical Exam   Constitutional: She appears well-developed and well-nourished.   HENT:   Head: Normocephalic.   Cardiovascular: Normal rate, regular rhythm, normal heart sounds and intact distal pulses. Exam reveals no  friction rub.   No murmur heard.  Pulmonary/Chest: Effort normal and breath sounds normal. No stridor. No respiratory distress. She has no wheezes. She has no rales. She exhibits no tenderness.   Musculoskeletal:        Back:    Skin: She is not diaphoretic.       Assessment:       1. Immunization due    2. Herpes zoster without complication    3. Hyperlipidemia, unspecified hyperlipidemia type    4. Obesity (BMI 30.0-34.9)    5. Preventative health care        Plan:       Matilda was seen today for rash.    Diagnoses and all orders for this visit:    Immunization due  -     varicella-zoster gE-AS01B, PF, (SHINGRIX, PF,) 50 mcg/0.5 mL injection; Inject 0.5 mLs into the muscle once. for 1 dose    Herpes zoster without complication  -     acyclovir (ZOVIRAX) 400 MG tablet; Take 1 tablet (400 mg total) by mouth 2 (two) times daily. for 7 days    Hyperlipidemia, unspecified hyperlipidemia type    Obesity (BMI 30.0-34.9)  -     Vitamin D; Future    Preventative health care  -     Lipid panel; Future  -     Comprehensive metabolic panel; Future         Follow up in about 1 month (around 2/22/2020) for annual.

## 2020-04-27 PROBLEM — Z00.00 PREVENTATIVE HEALTH CARE: Status: RESOLVED | Noted: 2020-01-22 | Resolved: 2020-04-27
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
## 2020-11-09 ENCOUNTER — OFFICE VISIT (OUTPATIENT)
Dept: FAMILY MEDICINE | Facility: CLINIC | Age: 70
End: 2020-11-09
Payer: COMMERCIAL

## 2020-11-09 VITALS
HEIGHT: 65 IN | SYSTOLIC BLOOD PRESSURE: 132 MMHG | TEMPERATURE: 98 F | HEART RATE: 74 BPM | RESPIRATION RATE: 19 BRPM | DIASTOLIC BLOOD PRESSURE: 64 MMHG | BODY MASS INDEX: 31.32 KG/M2 | WEIGHT: 188 LBS | OXYGEN SATURATION: 99 %

## 2020-11-09 DIAGNOSIS — Z12.11 SCREENING FOR COLON CANCER: ICD-10-CM

## 2020-11-09 DIAGNOSIS — G51.0 BELL'S PALSY: ICD-10-CM

## 2020-11-09 DIAGNOSIS — E66.9 OBESITY (BMI 30.0-34.9): Primary | ICD-10-CM

## 2020-11-09 DIAGNOSIS — Z00.00 PREVENTATIVE HEALTH CARE: ICD-10-CM

## 2020-11-09 PROCEDURE — 99214 PR OFFICE/OUTPT VISIT, EST, LEVL IV, 30-39 MIN: ICD-10-PCS | Mod: S$GLB,,, | Performed by: FAMILY MEDICINE

## 2020-11-09 PROCEDURE — 99214 OFFICE O/P EST MOD 30 MIN: CPT | Mod: S$GLB,,, | Performed by: FAMILY MEDICINE

## 2020-11-09 RX ORDER — METHYLPREDNISOLONE 4 MG/1
TABLET ORAL
Qty: 1 PACKAGE | Refills: 0 | Status: SHIPPED | OUTPATIENT
Start: 2020-11-09 | End: 2022-09-02

## 2020-11-09 RX ORDER — FAMCICLOVIR 500 MG/1
500 TABLET ORAL 3 TIMES DAILY
Qty: 21 TABLET | Refills: 0 | Status: SHIPPED | OUTPATIENT
Start: 2020-11-09 | End: 2020-11-16

## 2020-11-09 NOTE — PROGRESS NOTES
Subjective:       Patient ID: Matilda Mehta is a 70 y.o. female.    Chief Complaint: Tingling (sensation on L side of face, hx of bells palsy )      Patient complaining of the left eyelid drooping and left side of the face swollen has a history of Bell's palsy 7 years ago, with some residual. Some irritation on right eyelid.        Allergies and Medications:   Review of patient's allergies indicates:   Allergen Reactions    Penicillins      Current Outpatient Medications   Medication Sig Dispense Refill    acyclovir (ZOVIRAX) 400 MG tablet Take 1 tablet (400 mg total) by mouth 2 (two) times daily. for 7 days (Patient not taking: Reported on 11/9/2020) 14 tablet 0    famciclovir (FAMVIR) 500 MG tablet Take 1 tablet (500 mg total) by mouth 3 (three) times daily. for 7 days 21 tablet 0    FLUAD 7601-4142, 65 YR UP,,PF, 45 mcg (15 mcg x 3)/0.5 mL Syrg ADM 0.5ML IM UTD      guaiFENesin (MUCINEX) 600 mg 12 hr tablet Take 1,200 mg by mouth 2 (two) times daily.      hydrocodone-chlorpheniramine (TUSSIONEX) 10-8 mg/5 mL suspension Take 5 mLs by mouth every 12 (twelve) hours as needed for Cough. (Patient not taking: Reported on 1/22/2020) 150 mL 0    methylPREDNISolone (MEDROL DOSEPACK) 4 mg tablet use as directed 1 Package 0    nystatin-triamcinolone (MYCOLOG II) cream Apply topically 4 (four) times daily. (Patient not taking: Reported on 1/22/2020) 30 g 0     No current facility-administered medications for this visit.        Family History:   Family History   Problem Relation Age of Onset    Hypertension Mother     Lung cancer Father     Brain cancer Father     Diabetes Father     Hypertension Father        Social History:   Social History     Socioeconomic History    Marital status:      Spouse name: Not on file    Number of children: Not on file    Years of education: Not on file    Highest education level: Not on file   Occupational History    Not on file   Social Needs    Financial resource  strain: Not on file    Food insecurity     Worry: Not on file     Inability: Not on file    Transportation needs     Medical: Not on file     Non-medical: Not on file   Tobacco Use    Smoking status: Former Smoker     Types: Cigarettes     Quit date:      Years since quittin.8    Smokeless tobacco: Never Used   Substance and Sexual Activity    Alcohol use: No    Drug use: No    Sexual activity: Never   Lifestyle    Physical activity     Days per week: Not on file     Minutes per session: Not on file    Stress: Not on file   Relationships    Social connections     Talks on phone: Not on file     Gets together: Not on file     Attends Religion service: Not on file     Active member of club or organization: Not on file     Attends meetings of clubs or organizations: Not on file     Relationship status: Not on file   Other Topics Concern    Not on file   Social History Narrative    Heat and Air: [employer]    Is able to drive a car    Always uses a seat belt    Living Situation: Lives with spouse    Type of House:Single family home        Depression Screen: 2017    Pap smear: 2016-    Eye Exam: 2016    Flu Vaccine: 10/01/2015    Shingles: 2015    Pneumovax: 2015    Bone Density: several years ago    Colonoscopy: No previous testing       Review of Systems    Objective:     Vitals:    20 1312   BP: 132/64   Pulse: 74   Resp: 19   Temp: 98 °F (36.7 °C)        Physical Exam  HENT:      Head:     Eyes:           Assessment:       1. Obesity (BMI 30.0-34.9)    2. Bell's palsy    3. Preventative health care    4. Screening for colon cancer        Plan:       Matilda was seen today for tingling.    Diagnoses and all orders for this visit:    Obesity (BMI 30.0-34.9)  -     Vitamin D; Future    Bell's palsy  -     famciclovir (FAMVIR) 500 MG tablet; Take 1 tablet (500 mg total) by mouth 3 (three) times daily. for 7 days  -     methylPREDNISolone (MEDROL DOSEPACK) 4  mg tablet; use as directed    Preventative health care  -     Lipid Panel; Future  -     Comprehensive Metabolic Panel; Future    Screening for colon cancer  -     Cologuard Screening (Multitarget Stool DNA); Future  -     Cologuard Screening (Multitarget Stool DNA)         Follow up in about 1 month (around 12/9/2020), or if symptoms worsen or fail to improve, for annual.

## 2021-01-20 ENCOUNTER — TELEPHONE (OUTPATIENT)
Dept: FAMILY MEDICINE | Facility: CLINIC | Age: 71
End: 2021-01-20

## 2021-03-12 ENCOUNTER — TELEPHONE (OUTPATIENT)
Dept: FAMILY MEDICINE | Facility: CLINIC | Age: 71
End: 2021-03-12

## 2021-03-31 DIAGNOSIS — N64.59: ICD-10-CM

## 2021-03-31 DIAGNOSIS — R92.8 ABNORMAL MAMMOGRAM: Primary | ICD-10-CM

## 2021-04-27 ENCOUNTER — HOSPITAL ENCOUNTER (OUTPATIENT)
Dept: RADIOLOGY | Facility: HOSPITAL | Age: 71
Discharge: HOME | End: 2021-04-27
Attending: OBSTETRICS & GYNECOLOGY
Payer: COMMERCIAL

## 2021-04-27 DIAGNOSIS — N64.59: ICD-10-CM

## 2021-04-27 DIAGNOSIS — R92.8 ABNORMAL MAMMOGRAM: ICD-10-CM

## 2021-04-27 PROCEDURE — 76642 ULTRASOUND BREAST LIMITED: CPT | Mod: TC,PO,LT,AY

## 2021-04-27 PROCEDURE — 77066 DX MAMMO INCL CAD BI: CPT | Mod: TC,PO,AY

## 2022-05-23 DIAGNOSIS — Z12.31 ENCOUNTER FOR SCREENING MAMMOGRAM FOR BREAST CANCER: Primary | ICD-10-CM

## 2022-06-03 ENCOUNTER — HOSPITAL ENCOUNTER (OUTPATIENT)
Dept: RADIOLOGY | Facility: HOSPITAL | Age: 72
Discharge: HOME | End: 2022-06-03
Attending: FAMILY MEDICINE
Payer: COMMERCIAL

## 2022-06-03 VITALS — HEIGHT: 66 IN | WEIGHT: 188.06 LBS | BODY MASS INDEX: 30.22 KG/M2

## 2022-06-03 DIAGNOSIS — Z12.31 ENCOUNTER FOR SCREENING MAMMOGRAM FOR BREAST CANCER: ICD-10-CM

## 2022-06-03 PROCEDURE — 77063 BREAST TOMOSYNTHESIS BI: CPT | Mod: TC,PO

## 2022-09-02 ENCOUNTER — OFFICE VISIT (OUTPATIENT)
Dept: FAMILY MEDICINE | Facility: CLINIC | Age: 72
End: 2022-09-02
Payer: COMMERCIAL

## 2022-09-02 VITALS
RESPIRATION RATE: 18 BRPM | HEIGHT: 66 IN | SYSTOLIC BLOOD PRESSURE: 99 MMHG | BODY MASS INDEX: 28.33 KG/M2 | OXYGEN SATURATION: 97 % | HEART RATE: 81 BPM | DIASTOLIC BLOOD PRESSURE: 60 MMHG | WEIGHT: 176.31 LBS | TEMPERATURE: 98 F

## 2022-09-02 DIAGNOSIS — Z00.00 PREVENTATIVE HEALTH CARE: ICD-10-CM

## 2022-09-02 DIAGNOSIS — R10.30 LOWER ABDOMINAL PAIN: Primary | ICD-10-CM

## 2022-09-02 DIAGNOSIS — R10.32 LEFT LOWER QUADRANT ABDOMINAL PAIN: ICD-10-CM

## 2022-09-02 LAB
BILIRUB UR QL STRIP: NEGATIVE
GLUCOSE UR QL STRIP: NEGATIVE
KETONES UR QL STRIP: NEGATIVE
LEUKOCYTE ESTERASE UR QL STRIP: NEGATIVE
PH, POC UA: 5.5 (ref 5–8.5)
POC BLOOD, URINE: NEGATIVE
POC NITRATES, URINE: NEGATIVE
PROT UR QL STRIP: NEGATIVE
SP GR UR STRIP: <=1.005 (ref 1–1.03)
UROBILINOGEN UR STRIP-ACNC: NORMAL (ref 0.2–8)

## 2022-09-02 PROCEDURE — 81003 POCT URINALYSIS, DIPSTICK, AUTOMATED, W/O SCOPE: ICD-10-PCS | Mod: QW,S$GLB,, | Performed by: FAMILY MEDICINE

## 2022-09-02 PROCEDURE — 99214 OFFICE O/P EST MOD 30 MIN: CPT | Mod: 25,S$GLB,, | Performed by: FAMILY MEDICINE

## 2022-09-02 PROCEDURE — 99214 PR OFFICE/OUTPT VISIT, EST, LEVL IV, 30-39 MIN: ICD-10-PCS | Mod: 25,S$GLB,, | Performed by: FAMILY MEDICINE

## 2022-09-02 PROCEDURE — 81003 URINALYSIS AUTO W/O SCOPE: CPT | Mod: QW,S$GLB,, | Performed by: FAMILY MEDICINE

## 2022-09-02 RX ORDER — LATANOPROST 50 UG/ML
1 SOLUTION/ DROPS OPHTHALMIC NIGHTLY
COMMUNITY
Start: 2022-08-07

## 2022-09-02 NOTE — PROGRESS NOTES
Subjective:       Patient ID: Matilda Mehta is a 72 y.o. female.    Chief Complaint: Back Pain      Is here because of back and flank pain started yesterday starts in the back and radiates around to the front but not to the groin her suprapubic area patient has not had a rash but suspected she might have had shingles last time this happened.  Patient has had a colonoscopy in 2020 and had 3 benign polyps done by Dr. Sandoval.  She was having dry heaves and vomiting yesterday but her symptoms have resolved this morning.  Status post ZANDER and BSO 1986.  Wt Readings from Last 3 Encounters:  09/02/22 : 80 kg (176 lb 4.8 oz)-cutting back  06/03/22 : 85.3 kg (188 lb 0.8 oz)  11/09/20 : 85.3 kg (188 lb)            Back Pain  This is a new problem. The current episode started yesterday. The problem occurs constantly. The problem has been gradually improving since onset. The pain is present in the sacro-iliac. The pain is at a severity of 6/10. The pain is moderate. The symptoms are aggravated by bending and lying down. Associated symptoms include abdominal pain. Pertinent negatives include no pelvic pain or perianal numbness.     Allergies and Medications:   Review of patient's allergies indicates:   Allergen Reactions    Penicillins      Current Outpatient Medications   Medication Sig Dispense Refill    latanoprost 0.005 % ophthalmic solution Place 1 drop into both eyes every evening.      acyclovir (ZOVIRAX) 400 MG tablet Take 1 tablet (400 mg total) by mouth 2 (two) times daily. for 7 days 14 tablet 0    methylPREDNISolone (MEDROL DOSEPACK) 4 mg tablet use as directed 1 Package 0    nystatin-triamcinolone (MYCOLOG II) cream Apply topically 4 (four) times daily. 30 g 0     No current facility-administered medications for this visit.       Family History:   Family History   Problem Relation Age of Onset    Hypertension Mother     Lung cancer Father     Brain cancer Father     Diabetes Father     Hypertension Father         Social History:   Social History     Socioeconomic History    Marital status:    Tobacco Use    Smoking status: Former     Types: Cigarettes     Quit date: 1985     Years since quittin.6    Smokeless tobacco: Never   Substance and Sexual Activity    Alcohol use: No    Drug use: No    Sexual activity: Never   Social History Narrative    Heat and Air: Central Air    Is able to drive a car    Always uses a seat belt    Living Situation: Lives with spouse    Type of House:Single family home        Depression Screen: 2017    Pap smear: 2016-    Eye Exam: 2016    Flu Vaccine: 10/01/2015    Shingles: 2015    Pneumovax: 2015    Bone Density: several years ago    Colonoscopy: No previous testing       Review of Systems   Gastrointestinal:  Positive for abdominal pain.   Genitourinary:  Negative for pelvic pain.   Musculoskeletal:  Positive for back pain.     Objective:     Vitals:    22 1107   BP: 99/60   Pulse: 81   Resp: 18   Temp: 98.4 °F (36.9 °C)        Physical Exam  Vitals and nursing note reviewed.   Constitutional:       General: She is not in acute distress.     Appearance: Normal appearance. She is well-developed and normal weight. She is not ill-appearing, toxic-appearing or diaphoretic.   HENT:      Head: Normocephalic and atraumatic.   Eyes:      Pupils: Pupils are equal, round, and reactive to light.   Cardiovascular:      Rate and Rhythm: Normal rate and regular rhythm.      Heart sounds: Normal heart sounds. No murmur heard.    No friction rub. No gallop.   Pulmonary:      Effort: Pulmonary effort is normal. No respiratory distress.      Breath sounds: Normal breath sounds. No stridor. No wheezing, rhonchi or rales.   Chest:      Chest wall: No tenderness.   Abdominal:      General: There is no distension.      Palpations: There is no mass.      Tenderness: There is no abdominal tenderness. There is no right CVA tenderness, left CVA tenderness,  guarding or rebound.      Hernia: No hernia is present.   Musculoskeletal:      Right lower leg: No edema.      Left lower leg: No edema.   Neurological:      Mental Status: She is alert.   Psychiatric:         Behavior: Behavior normal.         Thought Content: Thought content normal.         Judgment: Judgment normal.     Urinalysis is negative for blood ketones leukocytes or nitrites.  Specific gravity 1.005  Assessment:       1. Lower abdominal pain    2. Preventative health care    3. Left lower quadrant abdominal pain        Plan:       Matilda was seen today for back pain.    Diagnoses and all orders for this visit:    Lower abdominal pain  -     POCT Urinalysis, Dipstick, Automated, W/O Scope  -     CBC Auto Differential; Future  -     Comprehensive Metabolic Panel; Future  -     Lipase; Future    Preventative health care  -     Lipid Panel; Future  -     Hepatitis C Antibody; Future    Left lower quadrant abdominal pain  -     Cancel: CT Renal Stone Study ABD Pelvis WO; Future  -     CT Renal Stone Study ABD Pelvis WO; Future       Follow up in about 1 week (around 9/9/2022) for HRA with Alfie Barrera or [facility] as scheduled.

## 2022-09-14 ENCOUNTER — TELEPHONE (OUTPATIENT)
Dept: FAMILY MEDICINE | Facility: CLINIC | Age: 72
End: 2022-09-14

## 2022-09-14 NOTE — ADDENDUM NOTE
Addended by: RAMO SANCHEZ on: 9/14/2022 12:16 PM     Modules accepted: Orders     Implemented All Universal Safety Interventions:  [place] to call system. Call bell, personal items and telephone within reach. Instruct patient to call for assistance. Room bathroom lighting operational. Non-slip footwear when patient is off stretcher. Physically safe environment: no spills, clutter or unnecessary equipment. Stretcher in lowest position, wheels locked, appropriate side rails in place.

## 2022-09-15 ENCOUNTER — TELEPHONE (OUTPATIENT)
Dept: FAMILY MEDICINE | Facility: CLINIC | Age: 72
End: 2022-09-15

## 2022-09-15 NOTE — TELEPHONE ENCOUNTER
----- Message from Marquis Chew MD sent at 9/15/2022  7:58 AM CDT -----  Results Ok, notify patient.

## 2022-09-16 LAB
ALBUMIN SERPL-MCNC: 4.1 G/DL (ref 3.6–5.1)
ALBUMIN/GLOB SERPL: 2 (CALC) (ref 1–2.5)
ALP SERPL-CCNC: 52 U/L (ref 37–153)
ALT SERPL-CCNC: 15 U/L (ref 6–29)
AST SERPL-CCNC: 16 U/L (ref 10–35)
BASOPHILS # BLD AUTO: 57 CELLS/UL (ref 0–200)
BASOPHILS NFR BLD AUTO: 0.8 %
BILIRUB SERPL-MCNC: 0.4 MG/DL (ref 0.2–1.2)
BUN SERPL-MCNC: 12 MG/DL (ref 7–25)
BUN/CREAT SERPL: ABNORMAL (CALC) (ref 6–22)
CALCIUM SERPL-MCNC: 9.6 MG/DL (ref 8.6–10.4)
CHLORIDE SERPL-SCNC: 105 MMOL/L (ref 98–110)
CO2 SERPL-SCNC: 33 MMOL/L (ref 20–32)
CREAT SERPL-MCNC: 0.72 MG/DL (ref 0.6–1)
EGFR: 90 ML/MIN/1.73M2
EOSINOPHIL # BLD AUTO: 114 CELLS/UL (ref 15–500)
EOSINOPHIL NFR BLD AUTO: 1.6 %
ERYTHROCYTE [DISTWIDTH] IN BLOOD BY AUTOMATED COUNT: 13.1 % (ref 11–15)
GLOBULIN SER CALC-MCNC: 2.1 G/DL (CALC) (ref 1.9–3.7)
GLUCOSE SERPL-MCNC: 93 MG/DL (ref 65–99)
HCT VFR BLD AUTO: 41.1 % (ref 35–45)
HCV AB S/CO SERPL IA: 0.03
HCV AB SERPL QL IA: NORMAL
HGB BLD-MCNC: 13.6 G/DL (ref 11.7–15.5)
LIPASE SERPL-CCNC: 18 U/L (ref 7–60)
LYMPHOCYTES # BLD AUTO: 2187 CELLS/UL (ref 850–3900)
LYMPHOCYTES NFR BLD AUTO: 30.8 %
MCH RBC QN AUTO: 31.3 PG (ref 27–33)
MCHC RBC AUTO-ENTMCNC: 33.1 G/DL (ref 32–36)
MCV RBC AUTO: 94.7 FL (ref 80–100)
MONOCYTES # BLD AUTO: 525 CELLS/UL (ref 200–950)
MONOCYTES NFR BLD AUTO: 7.4 %
NEUTROPHILS # BLD AUTO: 4217 CELLS/UL (ref 1500–7800)
NEUTROPHILS NFR BLD AUTO: 59.4 %
PLATELET # BLD AUTO: 298 THOUSAND/UL (ref 140–400)
PMV BLD REES-ECKER: 12.4 FL (ref 7.5–12.5)
POTASSIUM SERPL-SCNC: 4.6 MMOL/L (ref 3.5–5.3)
PROT SERPL-MCNC: 6.2 G/DL (ref 6.1–8.1)
RBC # BLD AUTO: 4.34 MILLION/UL (ref 3.8–5.1)
SODIUM SERPL-SCNC: 142 MMOL/L (ref 135–146)
WBC # BLD AUTO: 7.1 THOUSAND/UL (ref 3.8–10.8)

## 2022-12-05 PROBLEM — Z00.00 PREVENTATIVE HEALTH CARE: Status: RESOLVED | Noted: 2022-09-02 | Resolved: 2022-12-05

## 2023-07-19 DIAGNOSIS — Z12.31 ENCOUNTER FOR SCREENING MAMMOGRAM FOR MALIGNANT NEOPLASM OF BREAST: Primary | ICD-10-CM

## 2023-07-26 ENCOUNTER — HOSPITAL ENCOUNTER (OUTPATIENT)
Dept: RADIOLOGY | Facility: HOSPITAL | Age: 73
Discharge: HOME | End: 2023-07-26
Attending: OBSTETRICS & GYNECOLOGY
Payer: COMMERCIAL

## 2023-07-26 DIAGNOSIS — Z12.31 ENCOUNTER FOR SCREENING MAMMOGRAM FOR MALIGNANT NEOPLASM OF BREAST: ICD-10-CM

## 2023-07-26 PROCEDURE — 77067 SCR MAMMO BI INCL CAD: CPT | Mod: TC,PO

## 2023-09-20 DIAGNOSIS — Z78.0 MENOPAUSE: ICD-10-CM

## 2023-12-12 ENCOUNTER — PATIENT OUTREACH (OUTPATIENT)
Dept: ADMINISTRATIVE | Facility: HOSPITAL | Age: 73
End: 2023-12-12
Payer: MEDICARE

## 2024-04-24 ENCOUNTER — OFFICE VISIT (OUTPATIENT)
Dept: FAMILY MEDICINE | Facility: CLINIC | Age: 74
End: 2024-04-24
Payer: COMMERCIAL

## 2024-04-24 VITALS
DIASTOLIC BLOOD PRESSURE: 78 MMHG | HEART RATE: 62 BPM | WEIGHT: 180 LBS | HEIGHT: 66 IN | RESPIRATION RATE: 18 BRPM | BODY MASS INDEX: 28.93 KG/M2 | TEMPERATURE: 97 F | SYSTOLIC BLOOD PRESSURE: 110 MMHG | OXYGEN SATURATION: 99 %

## 2024-04-24 DIAGNOSIS — F51.04 PSYCHOPHYSIOLOGICAL INSOMNIA: ICD-10-CM

## 2024-04-24 DIAGNOSIS — E78.2 MIXED HYPERLIPIDEMIA: Primary | ICD-10-CM

## 2024-04-24 PROCEDURE — 99213 OFFICE O/P EST LOW 20 MIN: CPT | Mod: S$GLB,,, | Performed by: FAMILY MEDICINE

## 2024-04-24 PROCEDURE — 99999 PR PBB SHADOW E&M-EST. PATIENT-LVL III: CPT | Mod: PBBFAC,,, | Performed by: FAMILY MEDICINE

## 2024-04-24 RX ORDER — SUVOREXANT 10 MG/1
1 TABLET, FILM COATED ORAL NIGHTLY PRN
Qty: 30 TABLET | Refills: 5 | Status: SHIPPED | OUTPATIENT
Start: 2024-04-24 | End: 2024-04-24

## 2024-04-24 RX ORDER — ESZOPICLONE 2 MG/1
2 TABLET, FILM COATED ORAL NIGHTLY
Qty: 30 TABLET | Refills: 0 | Status: SHIPPED | OUTPATIENT
Start: 2024-04-24 | End: 2024-05-24

## 2024-04-24 NOTE — PROGRESS NOTES
Subjective:       Patient ID: Matilda Mehta is a 73 y.o. female.    Chief Complaint: Labs Only      Is here for follow-up blood work  Lab Results       Component                Value               Date                       WBC                      7.1                 09/14/2022                 HGB                      13.6                09/14/2022                 HCT                      41.1                09/14/2022                 PLT                      298                 09/14/2022                 ALT                      15                  09/14/2022                 AST                      16                  09/14/2022                 NA                       142                 09/14/2022                 K                        4.6                 09/14/2022                 CL                       105                 09/14/2022                 CREATININE               0.72                09/14/2022                 BUN                      12                  09/14/2022                 CO2                      33 (H)              09/14/2022            Patient Active Problem List:     Actinic keratosis     Acute otitis externa     Decreased hearing left ear     History of Bell's palsy     Hyperlipidemia     Obesity (BMI 30.0-34.9)     Herpes zoster without complication     Bell's palsy     Lower abdominal pain     Left lower quadrant abdominal pain            Allergies and Medications:   Review of patient's allergies indicates:   Allergen Reactions    Penicillins      Current Outpatient Medications   Medication Sig Dispense Refill    timoloL 0.25 % ophthalmic solution 1-2 drops 2 (two) times daily.      eszopiclone (LUNESTA) 2 MG Tab Take 1 tablet (2 mg total) by mouth every evening. 30 tablet 0    latanoprost 0.005 % ophthalmic solution Place 1 drop into both eyes every evening. (Patient not taking: Reported on 4/24/2024)       No current facility-administered medications for this visit.       Family  History:   Family History   Problem Relation Name Age of Onset    Hypertension Mother      Lung cancer Father      Brain cancer Father      Diabetes Father      Hypertension Father         Social History:   Social History     Socioeconomic History    Marital status:    Tobacco Use    Smoking status: Former     Current packs/day: 0.00     Types: Cigarettes     Quit date:      Years since quittin.3    Smokeless tobacco: Never   Substance and Sexual Activity    Alcohol use: No    Drug use: No    Sexual activity: Never   Social History Narrative    Heat and Air: Central Air    Is able to drive a car    Always uses a seat belt    Living Situation: Lives with spouse    Type of House:Single family home        Depression Screen: 2017    Pap smear: 2016-    Eye Exam: 2016    Flu Vaccine: 10/01/2015    Shingles: 2015    Pneumovax: 2015    Bone Density: several years ago    Colonoscopy: No previous testing       Review of Systems    Objective:     Vitals:    24 1008   BP: 110/78   Pulse: 62   Resp: 18   Temp: 97.4 °F (36.3 °C)        Physical Exam  Vitals and nursing note reviewed.   Constitutional:       General: She is not in acute distress.     Appearance: Normal appearance. She is well-developed and normal weight. She is not ill-appearing, toxic-appearing or diaphoretic.   HENT:      Head: Normocephalic and atraumatic.   Eyes:      Pupils: Pupils are equal, round, and reactive to light.   Cardiovascular:      Rate and Rhythm: Normal rate and regular rhythm.      Heart sounds: Normal heart sounds. No murmur heard.     No friction rub. No gallop.   Pulmonary:      Effort: Pulmonary effort is normal. No respiratory distress.      Breath sounds: Normal breath sounds. No stridor. No wheezing, rhonchi or rales.   Chest:      Chest wall: No tenderness.   Musculoskeletal:      Right lower leg: No edema.      Left lower leg: No edema.   Neurological:      Mental Status:  She is alert.   Psychiatric:         Behavior: Behavior normal.         Thought Content: Thought content normal.         Judgment: Judgment normal.         Assessment:       1. Mixed hyperlipidemia    2. Psychophysiological insomnia        Plan:       Matilda was seen today for labs only.    Diagnoses and all orders for this visit:    Mixed hyperlipidemia  -     LIPID PANEL; Future  -     Comprehensive Metabolic Panel; Future  -     LIPID PANEL  -     Comprehensive Metabolic Panel  -     Discontinue: suvorexant (BELSOMRA) 10 mg Tab; Take 1 tablet by mouth nightly as needed (sleep).    Psychophysiological insomnia  -     eszopiclone (LUNESTA) 2 MG Tab; Take 1 tablet (2 mg total) by mouth every evening.         Follow up in about 6 months (around 10/24/2024).

## 2024-05-20 ENCOUNTER — PATIENT MESSAGE (OUTPATIENT)
Dept: ADMINISTRATIVE | Facility: HOSPITAL | Age: 74
End: 2024-05-20
Payer: MEDICARE

## 2024-08-02 ENCOUNTER — PATIENT MESSAGE (OUTPATIENT)
Dept: ADMINISTRATIVE | Facility: HOSPITAL | Age: 74
End: 2024-08-02
Payer: MEDICARE

## 2024-08-15 DIAGNOSIS — Z12.31 ENCOUNTER FOR SCREENING MAMMOGRAM FOR MALIGNANT NEOPLASM OF BREAST: Primary | ICD-10-CM

## 2024-08-20 ENCOUNTER — HOSPITAL ENCOUNTER (OUTPATIENT)
Dept: RADIOLOGY | Facility: HOSPITAL | Age: 74
Discharge: HOME | End: 2024-08-20
Attending: FAMILY MEDICINE
Payer: COMMERCIAL

## 2024-08-20 DIAGNOSIS — Z12.31 ENCOUNTER FOR SCREENING MAMMOGRAM FOR MALIGNANT NEOPLASM OF BREAST: ICD-10-CM

## 2024-08-20 PROCEDURE — 77067 SCR MAMMO BI INCL CAD: CPT | Mod: TC,PO

## 2024-08-20 PROCEDURE — 77067 SCR MAMMO BI INCL CAD: CPT | Mod: 26,,, | Performed by: RADIOLOGY

## 2024-08-20 PROCEDURE — 77063 BREAST TOMOSYNTHESIS BI: CPT | Mod: 26,,, | Performed by: RADIOLOGY

## 2024-10-17 DIAGNOSIS — Z78.0 MENOPAUSE: ICD-10-CM

## 2024-11-08 ENCOUNTER — TELEPHONE (OUTPATIENT)
Dept: FAMILY MEDICINE | Facility: CLINIC | Age: 74
End: 2024-11-08
Payer: MEDICARE

## 2024-11-08 NOTE — TELEPHONE ENCOUNTER
----- Message from Gris sent at 11/7/2024  2:32 PM CST -----  Vm- 1:59- pt is calling to get a pcp number   [phone]

## 2024-11-12 ENCOUNTER — TELEPHONE (OUTPATIENT)
Dept: FAMILY MEDICINE | Facility: CLINIC | Age: 74
End: 2024-11-12
Payer: MEDICARE

## 2024-11-13 ENCOUNTER — TELEPHONE (OUTPATIENT)
Dept: FAMILY MEDICINE | Facility: CLINIC | Age: 74
End: 2024-11-13
Payer: MEDICARE

## 2025-01-03 ENCOUNTER — OFFICE VISIT (OUTPATIENT)
Dept: FAMILY MEDICINE | Facility: CLINIC | Age: 75
End: 2025-01-03
Payer: MEDICARE

## 2025-01-03 VITALS
OXYGEN SATURATION: 97 % | SYSTOLIC BLOOD PRESSURE: 120 MMHG | HEART RATE: 90 BPM | RESPIRATION RATE: 18 BRPM | HEIGHT: 66 IN | BODY MASS INDEX: 28.77 KG/M2 | WEIGHT: 179 LBS | TEMPERATURE: 99 F | DIASTOLIC BLOOD PRESSURE: 82 MMHG

## 2025-01-03 DIAGNOSIS — J20.9 ACUTE BRONCHITIS, UNSPECIFIED ORGANISM: ICD-10-CM

## 2025-01-03 DIAGNOSIS — J01.40 ACUTE NON-RECURRENT PANSINUSITIS: Primary | ICD-10-CM

## 2025-01-03 LAB
CTP QC/QA: YES
CTP QC/QA: YES
POC MOLECULAR INFLUENZA A AGN: NEGATIVE
POC MOLECULAR INFLUENZA B AGN: NEGATIVE
SARS-COV-2 RDRP RESP QL NAA+PROBE: NEGATIVE

## 2025-01-03 PROCEDURE — 99213 OFFICE O/P EST LOW 20 MIN: CPT | Mod: PBBFAC,PN | Performed by: FAMILY MEDICINE

## 2025-01-03 PROCEDURE — 99999 PR PBB SHADOW E&M-EST. PATIENT-LVL III: CPT | Mod: PBBFAC,,, | Performed by: FAMILY MEDICINE

## 2025-01-03 RX ORDER — BENZONATATE 100 MG/1
100 CAPSULE ORAL 3 TIMES DAILY PRN
Qty: 30 CAPSULE | Refills: 1 | Status: SHIPPED | OUTPATIENT
Start: 2025-01-03 | End: 2025-01-23

## 2025-01-03 NOTE — PROGRESS NOTES
Subjective:       Patient ID: Matilda Mehta is a 74 y.o. female.    Chief Complaint: Nasal Congestion and Cough      History of Present Illness    CHIEF COMPLAINT:  Ms. Mehta presents with a two-day history of cough and sinus congestion.    HPI:  Ms. Mehta reports a two-day history of cough and sinus congestion with excessive lacrimation. The cough is productive with whitish and cloudy mucus. Ms. Mehta denies fever, hemoptysis, or epistaxis. The symptoms have been present for 2 days, suggesting an acute onset. The practitioner notes that in the early stages, it is difficult to distinguish between COVID, flu, or other viral infections based on symptoms alone.    MEDICAL HISTORY:  Ms. Mehta has a history of smoking but quit approximately 15 years ago.    SOCIAL HISTORY:  Ms. Mehta is a former smoker who quit approximately 15 years ago.      ROS:  Constitutional: -fevers  ENT: +nasal congestion, +nasal discharge  Respiratory: +cough          Allergies and Medications:   Review of patient's allergies indicates:   Allergen Reactions    Penicillins      Current Outpatient Medications   Medication Sig Dispense Refill    timoloL 0.25 % ophthalmic solution 1-2 drops 2 (two) times daily.      benzonatate (TESSALON) 100 MG capsule Take 1 capsule (100 mg total) by mouth 3 (three) times daily as needed for Cough. 30 capsule 1    latanoprost 0.005 % ophthalmic solution Place 1 drop into both eyes every evening. (Patient not taking: Reported on 1/3/2025)       No current facility-administered medications for this visit.       Family History:   Family History   Problem Relation Name Age of Onset    Hypertension Mother      Lung cancer Father      Brain cancer Father      Diabetes Father      Hypertension Father         Social History:   Social History     Socioeconomic History    Marital status:    Tobacco Use    Smoking status: Former     Current packs/day: 0.00     Types: Cigarettes     Quit date: 1985     Years  since quittin.0    Smokeless tobacco: Never   Substance and Sexual Activity    Alcohol use: No    Drug use: No    Sexual activity: Never   Social History Narrative    Heat and Air: Central Air    Is able to drive a car    Always uses a seat belt    Living Situation: Lives with spouse    Type of House:Single family home        Depression Screen: 2017    Pap smear: 2016-    Eye Exam: 2016    Flu Vaccine: 10/01/2015    Shingles: 2015    Pneumovax: 2015    Bone Density: several years ago    Colonoscopy: No previous testing           Objective:     Vitals:    25 1400   BP: 120/82   Pulse: 90   Resp: 18   Temp: 98.5 °F (36.9 °C)        Physical Exam    General: No acute distress. Well-developed. Well-nourished.  Eyes: EOMI. Sclerae anicteric.  HENT: Normocephalic. Atraumatic. Nares patent. Moist oral mucosa. Pharynx clear without erythema or tonsillar swelling.  Ears: Bilateral TMs clear. Bilateral EACs clear.  Cardiovascular: Regular rate. Regular rhythm. No murmurs. No rubs. No gallops. Normal S1, S2.  Respiratory: Normal respiratory effort. Clear to auscultation bilaterally. No rales. No rhonchi. No wheezing.  Musculoskeletal: No  obvious deformity.  Extremities: No lower extremity edema.  Neurological: Alert & oriented x3. No slurred speech. Normal gait.  Psychiatric: Normal mood. Normal affect. Good insight. Good judgment.  Skin: Warm. Dry. No rash.  Neck: No cervical lymphadenopathy.            Assessment:       1. Acute non-recurrent pansinusitis    2. Acute bronchitis, unspecified organism        Plan:       Assessment & Plan    IMPRESSION:  - Suspected viral infection, potentially COVID or flu, based on 2-day history of cough and sinus congestion without fever  - Diagnosed acute bronchitis  - Considering antibiotics pending test results  - Evaluating need for specific antivirals based on COVID/flu test outcomes    ACUTE BRONCHITIS:  - Diagnosed the patient with  acute bronchitis based on the two-day history of cough and mucus production.  - Auscultated the lungs, which were clear bilaterally.  - Will consider prescribing antibiotics or specific antivirals based on test results.    RESPIRATORY INFECTION (DIFFERENTIAL DIAGNOSIS):  - Explained to the patient the difficulty in differentiating between COVID-19 and other viral infections in early stages without testing.  - Ordered COVID-19 and influenza tests.  - Educated the patient that whitish, cloudy mucus can be more indicative of bacterial infection than yellow or green mucus.  - Assessed that the patient's whitish, cloudy mucus is more likely to be bacterial in nature.    SINUS CONGESTION:  - Noted that the patient presents with sinus congestion.  - Confirmed absence of epistaxis when the patient blows their nose.    NASAL CONGESTION:  - Noted that the patient presents with nasal congestion and epiphora.    SMOKING HISTORY:  - Documented that the patient quit smoking approximately 15 years ago.    FOLLOW UP:  - Instructed the patient to follow up after test results are available for further management.        Matilda was seen today for nasal congestion and cough.    Diagnoses and all orders for this visit:    Acute non-recurrent pansinusitis  -     POCT COVID-19 Rapid Screening  -     POCT Influenza A/B Molecular    Acute bronchitis, unspecified organism  -     benzonatate (TESSALON) 100 MG capsule; Take 1 capsule (100 mg total) by mouth 3 (three) times daily as needed for Cough.         No follow-ups on file.  This note was generated with the assistance of ambient listening technology. Verbal consent was obtained by the patient and accompanying visitor(s) for the recording of patient appointment to facilitate this note. I attest to having reviewed and edited the generated note for accuracy, though some syntax or spelling errors may persist. Please contact the author of this note for any clarification.   Subjective:        Patient ID: Matilda eMhta is a 74 y.o. female.    Chief Complaint: Nasal Congestion and Cough      History of Present Illness    CHIEF COMPLAINT:  Ms. Mehta presents with a two-day history of cough and sinus congestion.    HPI:  Ms. Mehta reports a two-day history of cough and sinus congestion with excessive lacrimation. The cough is productive with whitish and cloudy mucus. Ms. Mehta denies fever, hemoptysis, or epistaxis. The symptoms have been present for 2 days, suggesting an acute onset. The practitioner notes that in the early stages, it is difficult to distinguish between COVID, flu, or other viral infections based on symptoms alone.    MEDICAL HISTORY:  Ms. eMhta has a history of smoking but quit approximately 15 years ago.    SOCIAL HISTORY:  Ms. Mehta is a former smoker who quit approximately 15 years ago.      ROS:  Constitutional: -fevers  ENT: +nasal congestion, +nasal discharge  Respiratory: +cough          Allergies and Medications:   Review of patient's allergies indicates:   Allergen Reactions    Penicillins      Current Outpatient Medications   Medication Sig Dispense Refill    timoloL 0.25 % ophthalmic solution 1-2 drops 2 (two) times daily.      benzonatate (TESSALON) 100 MG capsule Take 1 capsule (100 mg total) by mouth 3 (three) times daily as needed for Cough. 30 capsule 1    latanoprost 0.005 % ophthalmic solution Place 1 drop into both eyes every evening. (Patient not taking: Reported on 1/3/2025)       No current facility-administered medications for this visit.       Family History:   Family History   Problem Relation Name Age of Onset    Hypertension Mother      Lung cancer Father      Brain cancer Father      Diabetes Father      Hypertension Father         Social History:   Social History     Socioeconomic History    Marital status:    Tobacco Use    Smoking status: Former     Current packs/day: 0.00     Types: Cigarettes     Quit date:      Years since quittin.0     Smokeless tobacco: Never   Substance and Sexual Activity    Alcohol use: No    Drug use: No    Sexual activity: Never   Social History Narrative    Heat and Air: [place] [employer]    Is able to drive a car    Always uses a seat belt    Living Situation: Lives with spouse    Type of House:Single family home        Depression Screen: 01/03/2017    Pap smear: 01/01/2016-    Eye Exam: 01/1/2016    Flu Vaccine: 10/01/2015    Shingles: 01/01/2015    Pneumovax: 01/01/2015    Bone Density: several years ago    Colonoscopy: No previous testing           Objective:     Vitals:    01/03/25 1400   BP: 120/82   Pulse: 90   Resp: 18   Temp: 98.5 °F (36.9 °C)        Physical Exam    General: No acute distress. Well-developed. Well-nourished.  Eyes: EOMI. Sclerae anicteric.  HENT: Normocephalic. Atraumatic. Nares patent. Moist oral mucosa. Pharynx clear without erythema or tonsillar swelling.  Ears: Bilateral TMs clear. Bilateral EACs clear.  Cardiovascular: Regular rate. Regular rhythm. No murmurs. No rubs. No gallops. Normal S1, S2.  Respiratory: Normal respiratory effort. Clear to auscultation bilaterally. No rales. No rhonchi. No wheezing.  Musculoskeletal: No  obvious deformity.  Extremities: No lower extremity edema.  Neurological: Alert & oriented x3. No slurred speech. Normal gait.  Psychiatric: Normal mood. Normal affect. Good insight. Good judgment.  Skin: Warm. Dry. No rash.  Neck: No cervical lymphadenopathy.            Assessment:       1. Acute non-recurrent pansinusitis    2. Acute bronchitis, unspecified organism        Plan:       Assessment & Plan    IMPRESSION:  - Suspected viral infection, potentially COVID or flu, based on 2-day history of cough and sinus congestion without fever  - Diagnosed acute bronchitis  - Considering antibiotics pending test results  - Evaluating need for specific antivirals based on COVID/flu test outcomes    ACUTE BRONCHITIS:  - Diagnosed the patient with acute bronchitis based  on the two-day history of cough and mucus production.  - Auscultated the lungs, which were clear bilaterally.  - Will consider prescribing antibiotics or specific antivirals based on test results.    RESPIRATORY INFECTION (DIFFERENTIAL DIAGNOSIS):  - Explained to the patient the difficulty in differentiating between COVID-19 and other viral infections in early stages without testing.  - Ordered COVID-19 and influenza tests.  - Educated the patient that whitish, cloudy mucus can be more indicative of bacterial infection than yellow or green mucus.  - Assessed that the patient's whitish, cloudy mucus is more likely to be bacterial in nature.    SINUS CONGESTION:  - Noted that the patient presents with sinus congestion.  - Confirmed absence of epistaxis when the patient blows their nose.    NASAL CONGESTION:  - Noted that the patient presents with nasal congestion and epiphora.    SMOKING HISTORY:  - Documented that the patient quit smoking approximately 15 years ago.    FOLLOW UP:  - Instructed the patient to follow up after test results are available for further management.        Matilda was seen today for nasal congestion and cough.    Diagnoses and all orders for this visit:    Acute non-recurrent pansinusitis  -     POCT COVID-19 Rapid Screening  -     POCT Influenza A/B Molecular    Acute bronchitis, unspecified organism  -     benzonatate (TESSALON) 100 MG capsule; Take 1 capsule (100 mg total) by mouth 3 (three) times daily as needed for Cough.         No follow-ups on file.  This note was generated with the assistance of ambient listening technology. Verbal consent was obtained by the patient and accompanying visitor(s) for the recording of patient appointment to facilitate this note. I attest to having reviewed and edited the generated note for accuracy, though some syntax or spelling errors may persist. Please contact the author of this note for any clarification.   Subjective:       Patient ID: Matilda ASIF  Tyson is a 74 y.o. female.    Chief Complaint: Nasal Congestion and Cough      History of Present Illness    CHIEF COMPLAINT:  Ms. Mehta presents with a two-day history of cough and sinus congestion.    HPI:  Ms. Mehta reports a two-day history of cough and sinus congestion with excessive lacrimation. The cough is productive with whitish and cloudy mucus. Ms. Mehta denies fever, hemoptysis, or epistaxis. The symptoms have been present for 2 days, suggesting an acute onset. The practitioner notes that in the early stages, it is difficult to distinguish between COVID, flu, or other viral infections based on symptoms alone.    MEDICAL HISTORY:  Ms. Mehta has a history of smoking but quit approximately 15 years ago.    SOCIAL HISTORY:  Ms. Mehta is a former smoker who quit approximately 15 years ago.      ROS:  Constitutional: -fevers  ENT: +nasal congestion, +nasal discharge  Respiratory: +cough          Allergies and Medications:   Review of patient's allergies indicates:   Allergen Reactions    Penicillins      Current Outpatient Medications   Medication Sig Dispense Refill    timoloL 0.25 % ophthalmic solution 1-2 drops 2 (two) times daily.      benzonatate (TESSALON) 100 MG capsule Take 1 capsule (100 mg total) by mouth 3 (three) times daily as needed for Cough. 30 capsule 1    latanoprost 0.005 % ophthalmic solution Place 1 drop into both eyes every evening. (Patient not taking: Reported on 1/3/2025)       No current facility-administered medications for this visit.       Family History:   Family History   Problem Relation Name Age of Onset    Hypertension Mother      Lung cancer Father      Brain cancer Father      Diabetes Father      Hypertension Father         Social History:   Social History     Socioeconomic History    Marital status:    Tobacco Use    Smoking status: Former     Current packs/day: 0.00     Types: Cigarettes     Quit date:      Years since quittin.0    Smokeless tobacco:  Never   Substance and Sexual Activity    Alcohol use: No    Drug use: No    Sexual activity: Never   Social History Narrative    Heat and Air: [place] Air    Is able to drive a car    Always uses a seat belt    Living Situation: Lives with spouse    Type of House:Single family home        Depression Screen: 01/03/2017    Pap smear: 01/01/2016-    Eye Exam: 01/1/2016    Flu Vaccine: 10/01/2015    Shingles: 01/01/2015    Pneumovax: 01/01/2015    Bone Density: several years ago    Colonoscopy: No previous testing           Objective:     Vitals:    01/03/25 1400   BP: 120/82   Pulse: 90   Resp: 18   Temp: 98.5 °F (36.9 °C)        Physical Exam    General: No acute distress. Well-developed. Well-nourished.  Eyes: EOMI. Sclerae anicteric.  HENT: Normocephalic. Atraumatic. Nares patent. Moist oral mucosa. Pharynx clear without erythema or tonsillar swelling.  Ears: Bilateral TMs clear. Bilateral EACs clear.  Cardiovascular: Regular rate. Regular rhythm. No murmurs. No rubs. No gallops. Normal S1, S2.  Respiratory: Normal respiratory effort. Clear to auscultation bilaterally. No rales. No rhonchi. No wheezing.  Musculoskeletal: No  obvious deformity.  Extremities: No lower extremity edema.  Neurological: Alert & oriented x3. No slurred speech. Normal gait.  Psychiatric: Normal mood. Normal affect. Good insight. Good judgment.  Skin: Warm. Dry. No rash.  Neck: No cervical lymphadenopathy.          POCT COVID and flu tests all negative.  Assessment:       1. Acute non-recurrent pansinusitis this most likely represents acute viral infection patient has no other risk factors for bacterial infection.   2. Acute bronchitis, unspecified organism        Plan:       Assessment & Plan    IMPRESSION:  - Suspected viral infection, potentially COVID or flu, based on 2-day history of cough and sinus congestion without fever  - Diagnosed acute bronchitis  - Considering antibiotics pending test results  - Evaluating need for  specific antivirals based on COVID/flu test outcomes    ACUTE BRONCHITIS:  - Diagnosed the patient with acute bronchitis based on the two-day history of cough and mucus production.  - Auscultated the lungs, which were clear bilaterally.  - Will consider prescribing antibiotics or specific antivirals based on test results.    RESPIRATORY INFECTION (DIFFERENTIAL DIAGNOSIS):  - Explained to the patient the difficulty in differentiating between COVID-19 and other viral infections in early stages without testing.  - Ordered COVID-19 and influenza tests.  - Educated the patient that whitish, cloudy mucus can be more indicative of bacterial infection than yellow or green mucus.  - Assessed that the patient's whitish, cloudy mucus is more likely to be bacterial in nature.    SINUS CONGESTION:  - Noted that the patient presents with sinus congestion.  - Confirmed absence of epistaxis when the patient blows their nose.    NASAL CONGESTION:  - Noted that the patient presents with nasal congestion and epiphora.    SMOKING HISTORY:  - Documented that the patient quit smoking approximately 15 years ago.    FOLLOW UP:  - Instructed the patient to follow up after test results are available for further management.        Matilda was seen today for nasal congestion and cough.    Diagnoses and all orders for this visit:    Acute non-recurrent pansinusitis  -     POCT COVID-19 Rapid Screening  -     POCT Influenza A/B Molecular    Acute bronchitis, unspecified organism  -     benzonatate (TESSALON) 100 MG capsule; Take 1 capsule (100 mg total) by mouth 3 (three) times daily as needed for Cough.         No follow-ups on file.  This note was generated with the assistance of ambient listening technology. Verbal consent was obtained by the patient and accompanying visitor(s) for the recording of patient appointment to facilitate this note. I attest to having reviewed and edited the generated note for accuracy, though some syntax or spelling  errors may persist. Please contact the author of this note for any clarification.

## 2025-01-03 NOTE — PATIENT INSTRUCTIONS
Pocket Rx for Antibiotics:    Start Antibiotics  if :  1) infection lasting longer than 5 days  2) unilateral symptoms in the sinuses or unilateral nosebleed  3) fever greater than 100.4  4) cough or nasal discharge productive of blood

## 2025-03-24 ENCOUNTER — PATIENT MESSAGE (OUTPATIENT)
Dept: FAMILY MEDICINE | Facility: CLINIC | Age: 75
End: 2025-03-24
Payer: MEDICARE

## 2025-05-05 ENCOUNTER — TELEPHONE (OUTPATIENT)
Dept: FAMILY MEDICINE | Facility: CLINIC | Age: 75
End: 2025-05-05
Payer: MEDICARE

## 2025-06-23 DIAGNOSIS — Z00.00 ENCOUNTER FOR MEDICARE ANNUAL WELLNESS EXAM: ICD-10-CM
